# Patient Record
Sex: MALE | Race: WHITE
[De-identification: names, ages, dates, MRNs, and addresses within clinical notes are randomized per-mention and may not be internally consistent; named-entity substitution may affect disease eponyms.]

---

## 2018-02-09 ENCOUNTER — HOSPITAL ENCOUNTER (EMERGENCY)
Dept: HOSPITAL 80 - FED | Age: 83
LOS: 10 days | Discharge: HOME | End: 2018-02-19
Payer: COMMERCIAL

## 2018-02-09 VITALS
HEART RATE: 88 BPM | SYSTOLIC BLOOD PRESSURE: 118 MMHG | OXYGEN SATURATION: 94 % | RESPIRATION RATE: 16 BRPM | DIASTOLIC BLOOD PRESSURE: 68 MMHG

## 2018-02-09 VITALS — TEMPERATURE: 97.7 F
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
## 2018-02-09 DIAGNOSIS — S01.111A: ICD-10-CM

## 2018-02-09 DIAGNOSIS — S61.011A: ICD-10-CM

## 2018-02-09 DIAGNOSIS — S09.90XA: Primary | ICD-10-CM
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
## 2018-02-09 PROCEDURE — 0HQ1XZZ REPAIR FACE SKIN, EXTERNAL APPROACH: ICD-10-PCS

## 2018-02-09 PROCEDURE — 0HQFXZZ REPAIR RIGHT HAND SKIN, EXTERNAL APPROACH: ICD-10-PCS

## 2018-02-09 NOTE — EDPHY
H & P


Time Seen by Provider: 02/09/18 14:43


HPI/ROS: 





CHIEF COMPLAINT: Fall, head/hand laceration





HISTORY OF PRESENT ILLNESS: 





This patient is an anticoagulated (Coumadin) 89 y/o male arriving with his wife 

following a mechanical fall. He stumbled while leaving a restaurant for lunch, 

and fell, striking his forehead, right leg, and right elbow. He sustained 

several lacerations and abrasions. He denies loss of consciousness. His wife 

helped him up and he was able to ambulate following the incident. They arrive 

for evaluation by private vehicle. The patient complains of pain to his right 

elbow and to his forehead. He denies any neck pain or visual changes. No nausea

, vomiting, weakness, numbness, paresthesias, or other associated symptoms. He 

and his wife are not sure if he has received a tetanus shot in the last ten 

years. 





REVIEW OF SYSTEMS:


A 10 point review of systems was performed and is negative with the exception 

of the elements mentioned in the history of present illness. 


 (Lina Sigala)


Past Medical/Surgical History: 





"Heart issues", anticoagulated (Coumadin). 





Patient and his wife are poor historians, other PMH unknown. No prior records 

exist on this patient for review.  (Lina Sigala)


Social History: 





. Wife at bedside. Lives in [place]. Retired.  (Lina Sigala)


Physical Exam: 





General Appearance: Alert, no distress


Head: 2cm linear laceration above right eyebrow. 


Eyes: No conjunctival erythema, PERRLA, EOMI


ENT, Mouth: No hemotympanum, no oral trauma, no bony tenderness


Neck: Non-tender, full range of motion without pain


Respiratory: No chest wall tenderness, lungs clear bilaterally


Cardiovascular:  Regular rate and rhythm 


Abdomen:  Abdomen is soft and non tender


Skin:  No lacerations, no abrasions


Back: No midline T/L/S tenderness


Extremities:  Pelvis is stable and nontender; right elbow tenderness with 

limited ROM. Abrasion to right knee. 1cm laceration to right thumb pad. 


Neurological:  A&Ox3, normal motor fun


 (Lina Sigala)


Constitutional: 


 Initial Vital Signs











Temperature (C)  36.5 C   02/09/18 13:54


 


Heart Rate  91   02/09/18 13:54


 


Respiratory Rate  18   02/09/18 13:54


 


Blood Pressure  114/52 L  02/09/18 13:54


 


O2 Sat (%)  98   02/09/18 13:54








 











O2 Delivery Mode               Room Air














Allergies/Adverse Reactions: 


 





No Known Allergies Allergy (Unverified 02/09/18 13:53)


 








Home Medications: 














 Medication  Instructions  Recorded


 


Coumadin  02/09/18














Medical Decision Making





- Diagnostics


Imaging: Discussed imaging studies w/ On call Radiologist





- Diagnostics


Imaging Results: 





Elbow X-Ray  02/09/18 15:20


Impression:  Suspicious for acute radial head fracture, although the lack of 

elbow joint effusion is conspicuous. Consider noncontrast CT if clinically 

indicated.


  (Lina Sigala)


Procedures: 





My involvement care this patient is for the procedures.  Please see the note of 

Dr. Sigala for all other aspects of care.








PROCEDURE: Laceration repair, 1.  Right eyebrow


Consent:  Verbal


Location:  Right eyebrow


Length of repair:  2 cm


Complexity:  Simple


Layer involvement:  Single


Anesthesia:  Local.  1% lidocaine plain 3 mL.  0.5% Marcaine 3 mL


Irrigation:  Extensive


Debridement:  None


Procedure description:  Following good anesthesia, the wound was copiously 

irrigated.  Wound bed was explored with a sterile glove, and there is no 

foreign body noted.  No exposure of the galea or frontalis muscle.  Wound 

borders were approximated well with good hemostasis.  Tolerated well without 

complication.


Suture/Staple material:  6-0 Prolene, 3 simple interrupted sutures


Wound care:  Routine as discussed


Suture/Staple removal: 7 Days








PROCEDURE: Laceration repair, 2.  Right thumb


Consent:  Verbal


Location:  Right thumb, Pad


Length of repair:  1 cm


Complexity:  Simple


Layer involvement:  Single


Anesthesia:  Local per 1% lidocaine plain 2 mL


Irrigation:  Extensive


Debridement:  None


Procedure description:  Following good anesthesia, the wound was copiously 

irrigated.  Wound bed was explored with a sterile glove, and there is no 

foreign body noted.  No tendon injury. Wound borders were approximated well 

with good hemostasis.  Tolerated well without complication.


Suture/Staple material:  5-0 Prolene, 2 simple interrupted sutures


Wound care:  Routine as discussed


Suture/Staple removal:  7-10 Days (Ej Haro)


ED Course/Re-evaluation: 





This patient presents with multiple injuries after a mechanical fall.  He is on 

Coumadin, so CT scan of the brain was ordered and is negative.  He has no neck 

tenderness or pain with range of motion.  He has facial and hand lacerations, 

which were sutured by Ej Haro.  He has severe arthritis of the right elbow 

and slightly increased pain after the fall.  X-ray of the right elbow reveals 

severe DJD and no joint effusion; doubt fracture.  Pt declines sling.  After 

the wounds were cleaned and the lacerations sutured, the patient ambulated in 

the emergency department using his cane.  He has an unsteady gait and is 

clearly a fall risk.  After the attempt with ambulation, I spoke to the patient 

and his wife.  They have been working with a physical and occupational 

therapist regarding his gait.  I am uncomfortable sending this patient home 

because I think that he will fall again.  I think that he should be admitted 

for further rehab.  However, after much discussion the patient and his wife 

refused admission.  They except the risk of falling, and understand risks are 

serious, especially that he is on Coumadin.  They are competent decision 

makers.  He lives with his grandson, who will assist him whenever he ambulates.


 (Lina Sigala)


Differential Diagnosis: 





Differential diagnosis includes though it is not limited to fracture, 

intracranial hemorrhage, pneumothorax, hemothorax, intra-abdominal hemorrhage.


 (Lina Sigala)





Departure





- Departure


Disposition: Home, Routine, Self-Care


Clinical Impression: 


Facial laceration


Qualifiers:


 Encounter type: initial encounter Qualified Code(s): S01.81XA - Laceration 

without foreign body of other part of head, initial encounter





Head injury


Qualifiers:


 Encounter type: initial encounter Qualified Code(s): S09.90XA - Unspecified 

injury of head, initial encounter





Condition: Good


Instructions:  Head Injury (ED), Facial Laceration (ED)


Additional Instructions: 


Return for suture removal in 5 days (facial sutures).


Return for suture removal in 10 days (hand sutures).


Follow-up with your doctor regarding the elbow pain.





Referrals: 


JEANNE LOPEZ [Primary Care Provider] - As per Instructions


Report Scribed for: Lina Sigala


Report Scribed by: Maribel Brooks


Date of Report: 02/09/18


Time of Report: 16:42


Physician Review and Approval Statement: 





02/09/18 16:42


Portions of this note were transcribed by a medical scribe.  I personally 

performed a history, physical exam, medical decision making, and confirmed 

accuracy of information the transcribed note.


 (Lina Sigala)